# Patient Record
Sex: MALE | Race: WHITE | URBAN - METROPOLITAN AREA
[De-identification: names, ages, dates, MRNs, and addresses within clinical notes are randomized per-mention and may not be internally consistent; named-entity substitution may affect disease eponyms.]

---

## 2017-01-27 ENCOUNTER — OFFICE VISIT (OUTPATIENT)
Dept: FAMILY MEDICINE CLINIC | Age: 57
End: 2017-01-27

## 2017-01-27 VITALS
TEMPERATURE: 98.9 F | SYSTOLIC BLOOD PRESSURE: 136 MMHG | RESPIRATION RATE: 20 BRPM | DIASTOLIC BLOOD PRESSURE: 90 MMHG | HEART RATE: 95 BPM | OXYGEN SATURATION: 100 % | WEIGHT: 201.2 LBS | HEIGHT: 69 IN | BODY MASS INDEX: 29.8 KG/M2

## 2017-01-27 DIAGNOSIS — M75.102 ROTATOR CUFF SYNDROME, LEFT: ICD-10-CM

## 2017-01-27 DIAGNOSIS — Z00.00 ROUTINE GENERAL MEDICAL EXAMINATION AT A HEALTH CARE FACILITY: ICD-10-CM

## 2017-01-27 DIAGNOSIS — R07.9 CHEST PAIN, UNSPECIFIED TYPE: Primary | ICD-10-CM

## 2017-01-27 RX ORDER — GUAIFENESIN 100 MG/5ML
81 LIQUID (ML) ORAL 2 TIMES DAILY
COMMUNITY

## 2017-01-27 NOTE — MR AVS SNAPSHOT
Visit Information Date & Time Provider Department Dept. Phone Encounter #  
 1/27/2017 11:15 AM Samuel Casiano MD [address] [place] [address] [phone] [id number] Follow-up Instructions Return pending results. Follow-up and Disposition History Upcoming Health Maintenance Date Due Hepatitis C Screening 1960 DTaP/Tdap/Td series (1 - Tdap) 2/16/1982 FOBT Q 1 YEAR AGE 50-75 2/16/2011 INFLUENZA AGE 9 TO ADULT 8/1/2016 Allergies as of 1/27/2017  Review Complete On: 1/27/2017 By: Samuel Casiano MD  
  
 Severity Noted Reaction Type Reactions Pcn [Penicillins]  01/27/2017    Unknown (comments) Sulfa (Sulfonamide Antibiotics)  01/27/2017    Unknown (comments) Current Immunizations  Never Reviewed No immunizations on file. Not reviewed this visit You Were Diagnosed With   
  
 Codes Comments Chest pain, unspecified type    -  Primary ICD-10-CM: R07.9 ICD-9-CM: 786.50 Rotator cuff syndrome, left     ICD-10-CM: M75.102 ICD-9-CM: 726.10 Routine general medical examination at a health care facility     ICD-10-CM: Z00.00 ICD-9-CM: V70.0 Vitals BP Pulse Temp Resp Height(growth percentile) Weight(growth percentile) 136/90 95 98.9 °F (37.2 °C) 20 5' 8.5\" (1.74 m) 201 lb 3.2 oz (91.3 kg) SpO2 BMI Smoking Status 100% 30.15 kg/m2 Never Smoker Vitals History BMI and BSA Data Body Mass Index Body Surface Area  
 30.15 kg/m 2 2.1 m 2 Your Updated Medication List  
  
   
This list is accurate as of: 1/27/17 12:24 PM.  Always use your most recent med list.  
  
  
  
  
 aspirin 81 mg chewable tablet Take 81 mg by mouth two (2) times a day. We Performed the Following AMB POC EKG ROUTINE W/ 12 LEADS, INTER & REP [72628 CPT(R)] Follow-up Instructions Return pending results. To-Do List   
 01/27/2017   ECHO:  2D ECHO COMPLETE ADULT (TTE) W OR WO CONTR   
  
 01/27/2017 Lab:  CBC WITH AUTOMATED DIFF   
  
 01/27/2017 Lab:  LIPID PANEL   
  
 01/27/2017 Lab:  METABOLIC PANEL, COMPREHENSIVE   
  
 01/27/2017 ECG:  NUCLEAR STRESS TEST   
  
 01/27/2017 Lab:  T4, FREE   
  
 01/27/2017 Lab:  TSH 3RD GENERATION   
  
 01/27/2017 Imaging:  XR CHEST PA LAT   
  
 01/27/2017 Imaging:  XR SHOULDER LT AP/LAT MIN 2 V Introducing [facility]! Minna Sy introduces [employer] patient portal. Now you can access parts of your medical record, email your doctor's office, and request medication refills online. 1. In your internet browser, go to [email or web address] 2. Click on the First Time User? Click Here link in the Sign In box. You will see the New Member Sign Up page. 3. Enter your [employer] Access Code exactly as it appears below. You will not need to use this code after youve completed the sign-up process. If you do not sign up before the expiration date, you must request a new code. · [employer] Access Code: [email or web address] Expires: 4/27/2017 12:24 PM 
 
4. Enter the last four digits of your Social Security Number (xxxx) and Date of Birth (mm/dd/yyyy) as indicated and click Submit. You will be taken to the next sign-up page. 5. Create a [employer] ID. This will be your [employer] login ID and cannot be changed, so think of one that is secure and easy to remember. 6. Create a [employer] password. You can change your password at any time. 7. Enter your Password Reset Question and Answer. This can be used at a later time if you forget your password. 8. Enter your e-mail address. You will receive e-mail notification when new information is available in [address] 9. Click Sign Up. You can now view and download portions of your medical record. 10. Click the Download Summary menu link to download a portable copy of your medical information.  
 
If you have questions, please visit the Frequently Asked Questions section of the [email or web address]. Remember, [employer] is NOT to be used for urgent needs. For medical emergencies, dial 911. Now available from your iPhone and Android! Please provide this summary of care documentation to your next provider. If you have any questions after today's visit, please call [phone].

## 2017-01-27 NOTE — PROGRESS NOTES
Kinsey Conrad is a 55 y.o. male here today to [facility]. 1. Have you been to the ER, urgent care clinic since your last visit? Hospitalized since your last visit? No    2. Have you seen or consulted any other health care providers outside of the [address] [place] [address] since your last visit? Include any pap smears or colon screening.  Yes Where: [facility] Dr Brinda GARRETT specialist

## 2017-01-27 NOTE — PROGRESS NOTES
HISTORY OF PRESENT ILLNESS  Kelly Ibarra is a 55 y.o. male. HPI  Chest pain x 2 months  ++ family history of IHD  No trauma or precipitating event  No associated symptoms  Seems to relate th left shoulder movement  Review of Systems   Cardiovascular: Positive for chest pain. All other systems reviewed and are negative. Past Medical History   Diagnosis Date    Asthma     Detached retina 2016    Plantar fasciitis of right foot 2016     No current outpatient prescriptions on file prior to visit. No current facility-administered medications on file prior to visit. Visit Vitals    /90    Pulse 95    Temp 98.9 °F (37.2 °C)    Resp 20    Ht 5' 8.5\" (1.74 m)    Wt 201 lb 3.2 oz (91.3 kg)    SpO2 100%    BMI 30.15 kg/m2         Physical Exam   Constitutional: He appears well-developed and well-nourished. No distress. Neck: Normal range of motion. Neck supple. No thyromegaly present. Carotid upstroke normal   Cardiovascular: Normal rate, regular rhythm, normal heart sounds and intact distal pulses. Exam reveals no gallop and no friction rub. No murmur heard. Pulmonary/Chest: Effort normal and breath sounds normal. No respiratory distress. He has no wheezes. He has no rales. He exhibits no tenderness. Musculoskeletal: He exhibits no edema, tenderness or deformity. Marked crepitance left shoulder  Reduced rom   Lymphadenopathy:     He has no cervical adenopathy. Skin: He is not diaphoretic. Vitals reviewed. cxr-?  Hiatal hernia, nothing acute  Shoulder- neg   EKG-nsr, no acute changes  ASSESSMENT and PLAN  chest pain   Rotator cuff syndrome  Plan  xr shoulder, chest  Labs  Consider cardiac w/u given family history